# Patient Record
Sex: FEMALE | Race: WHITE | NOT HISPANIC OR LATINO | Employment: UNEMPLOYED | RURAL
[De-identification: names, ages, dates, MRNs, and addresses within clinical notes are randomized per-mention and may not be internally consistent; named-entity substitution may affect disease eponyms.]

---

## 2017-05-10 PROBLEM — Z00.00 WELLNESS EXAMINATION: Status: ACTIVE | Noted: 2017-05-10

## 2017-05-10 PROBLEM — J30.9 ALLERGIC RHINITIS: Chronic | Status: ACTIVE | Noted: 2017-05-10

## 2017-05-10 PROBLEM — E66.9 OBESITY: Chronic | Status: ACTIVE | Noted: 2017-05-10

## 2017-05-11 ENCOUNTER — OFFICE VISIT (OUTPATIENT)
Dept: FAMILY MEDICINE CLINIC | Facility: CLINIC | Age: 37
End: 2017-05-11

## 2017-05-11 VITALS
HEIGHT: 64 IN | DIASTOLIC BLOOD PRESSURE: 84 MMHG | BODY MASS INDEX: 33.63 KG/M2 | RESPIRATION RATE: 18 BRPM | HEART RATE: 116 BPM | TEMPERATURE: 98.6 F | OXYGEN SATURATION: 96 % | SYSTOLIC BLOOD PRESSURE: 126 MMHG | WEIGHT: 197 LBS

## 2017-05-11 DIAGNOSIS — M25.579 ANKLE PAIN, UNSPECIFIED CHRONICITY, UNSPECIFIED LATERALITY: Primary | ICD-10-CM

## 2017-05-11 DIAGNOSIS — F41.9 ANXIETY: ICD-10-CM

## 2017-05-11 PROCEDURE — 99213 OFFICE O/P EST LOW 20 MIN: CPT | Performed by: FAMILY MEDICINE

## 2017-05-11 RX ORDER — ACETAMINOPHEN 500 MG
500 TABLET ORAL EVERY 6 HOURS PRN
COMMUNITY

## 2017-05-11 RX ORDER — CALCIUM CARBONATE 200(500)MG
2 TABLET,CHEWABLE ORAL AS NEEDED
COMMUNITY

## 2017-05-11 RX ORDER — DIPHENHYDRAMINE HCL 25 MG
25 CAPSULE ORAL EVERY 6 HOURS PRN
COMMUNITY

## 2017-05-12 ENCOUNTER — HOSPITAL ENCOUNTER (OUTPATIENT)
Dept: HOSPITAL 58 - RAD | Age: 37
Discharge: HOME | End: 2017-05-12
Attending: FAMILY MEDICINE

## 2017-05-12 VITALS — BODY MASS INDEX: 29.2 KG/M2

## 2017-05-12 DIAGNOSIS — M25.572: Primary | ICD-10-CM

## 2017-05-12 DIAGNOSIS — F41.9 ANXIETY: ICD-10-CM

## 2017-05-12 DIAGNOSIS — E66.9 OBESITY, UNSPECIFIED OBESITY SEVERITY, UNSPECIFIED OBESITY TYPE: Primary | Chronic | ICD-10-CM

## 2017-05-12 DIAGNOSIS — Z00.00 WELLNESS EXAMINATION: ICD-10-CM

## 2017-05-12 NOTE — DI
EXAM:  LEFT ANKLE 3 VIEWS 

  

HISTORY:  Injury, pain 

  

  

FINDINGS:    Bone and joint structures appear normal.  There is no fracture, joint dislocation or mino
int effusion.  Bone density unremarkable. 

  

IMPRESSION:  Bone and joint structures are within normal limits.

## 2017-05-13 LAB
ALBUMIN SERPL-MCNC: 4.6 G/DL (ref 3.5–5.5)
ALBUMIN/GLOB SERPL: 1.6 {RATIO} (ref 1.2–2.2)
ALP SERPL-CCNC: 54 IU/L (ref 39–117)
ALT SERPL-CCNC: 9 IU/L (ref 0–32)
AST SERPL-CCNC: 12 IU/L (ref 0–40)
BASOPHILS # BLD AUTO: 0 X10E3/UL (ref 0–0.2)
BASOPHILS NFR BLD AUTO: 0 %
BILIRUB SERPL-MCNC: 0.5 MG/DL (ref 0–1.2)
BUN SERPL-MCNC: 13 MG/DL (ref 6–20)
BUN/CREAT SERPL: 18 (ref 9–23)
CALCIUM SERPL-MCNC: 9.5 MG/DL (ref 8.7–10.2)
CHLORIDE SERPL-SCNC: 100 MMOL/L (ref 96–106)
CHOLEST SERPL-MCNC: 198 MG/DL (ref 100–199)
CO2 SERPL-SCNC: 20 MMOL/L (ref 18–29)
CREAT SERPL-MCNC: 0.74 MG/DL (ref 0.57–1)
EOSINOPHIL # BLD AUTO: 0.5 X10E3/UL (ref 0–0.4)
EOSINOPHIL NFR BLD AUTO: 6 %
ERYTHROCYTE [DISTWIDTH] IN BLOOD BY AUTOMATED COUNT: 13.1 % (ref 12.3–15.4)
GLOBULIN SER CALC-MCNC: 2.8 G/DL (ref 1.5–4.5)
GLUCOSE SERPL-MCNC: 96 MG/DL (ref 65–99)
HBA1C MFR BLD: 5.4 % (ref 4.8–5.6)
HCT VFR BLD AUTO: 41.2 % (ref 34–46.6)
HDLC SERPL-MCNC: 69 MG/DL
HGB BLD-MCNC: 13.8 G/DL (ref 11.1–15.9)
IMM GRANULOCYTES # BLD: 0 X10E3/UL (ref 0–0.1)
IMM GRANULOCYTES NFR BLD: 0 %
LDLC SERPL CALC-MCNC: 115 MG/DL (ref 0–99)
LDLC/HDLC SERPL: 1.7 RATIO UNITS (ref 0–3.2)
LYMPHOCYTES # BLD AUTO: 2.2 X10E3/UL (ref 0.7–3.1)
LYMPHOCYTES NFR BLD AUTO: 27 %
MCH RBC QN AUTO: 28.3 PG (ref 26.6–33)
MCHC RBC AUTO-ENTMCNC: 33.5 G/DL (ref 31.5–35.7)
MCV RBC AUTO: 84 FL (ref 79–97)
MONOCYTES # BLD AUTO: 0.6 X10E3/UL (ref 0.1–0.9)
MONOCYTES NFR BLD AUTO: 7 %
NEUTROPHILS # BLD AUTO: 5 X10E3/UL (ref 1.4–7)
NEUTROPHILS NFR BLD AUTO: 60 %
PLATELET # BLD AUTO: 407 X10E3/UL (ref 150–379)
POTASSIUM SERPL-SCNC: 5 MMOL/L (ref 3.5–5.2)
PROT SERPL-MCNC: 7.4 G/DL (ref 6–8.5)
RBC # BLD AUTO: 4.88 X10E6/UL (ref 3.77–5.28)
SODIUM SERPL-SCNC: 140 MMOL/L (ref 134–144)
TRIGL SERPL-MCNC: 69 MG/DL (ref 0–149)
TSH SERPL DL<=0.005 MIU/L-ACNC: 1.69 UIU/ML (ref 0.45–4.5)
VLDLC SERPL CALC-MCNC: 14 MG/DL (ref 5–40)
WBC # BLD AUTO: 8.3 X10E3/UL (ref 3.4–10.8)

## 2017-05-16 DIAGNOSIS — D75.839 THROMBOCYTOSIS: Primary | ICD-10-CM

## 2017-05-18 ENCOUNTER — TELEPHONE (OUTPATIENT)
Dept: FAMILY MEDICINE CLINIC | Facility: CLINIC | Age: 37
End: 2017-05-18

## 2017-05-18 RX ORDER — AZITHROMYCIN 250 MG/1
TABLET, FILM COATED ORAL
Qty: 6 TABLET | Refills: 0 | Status: SHIPPED | OUTPATIENT
Start: 2017-05-18 | End: 2019-09-03 | Stop reason: SDUPTHER

## 2017-06-14 LAB
BASOPHILS # BLD AUTO: 0 X10E3/UL (ref 0–0.2)
BASOPHILS NFR BLD AUTO: 1 %
EOSINOPHIL # BLD AUTO: 0.8 X10E3/UL (ref 0–0.4)
EOSINOPHIL NFR BLD AUTO: 10 %
ERYTHROCYTE [DISTWIDTH] IN BLOOD BY AUTOMATED COUNT: 13.8 % (ref 12.3–15.4)
HCT VFR BLD AUTO: 40.9 % (ref 34–46.6)
HGB BLD-MCNC: 13.5 G/DL (ref 11.1–15.9)
IMM GRANULOCYTES # BLD: 0 X10E3/UL (ref 0–0.1)
IMM GRANULOCYTES NFR BLD: 0 %
LYMPHOCYTES # BLD AUTO: 2 X10E3/UL (ref 0.7–3.1)
LYMPHOCYTES NFR BLD AUTO: 27 %
MCH RBC QN AUTO: 28.1 PG (ref 26.6–33)
MCHC RBC AUTO-ENTMCNC: 33 G/DL (ref 31.5–35.7)
MCV RBC AUTO: 85 FL (ref 79–97)
MONOCYTES # BLD AUTO: 0.5 X10E3/UL (ref 0.1–0.9)
MONOCYTES NFR BLD AUTO: 7 %
NEUTROPHILS # BLD AUTO: 4.3 X10E3/UL (ref 1.4–7)
NEUTROPHILS NFR BLD AUTO: 55 %
PLATELET # BLD AUTO: 407 X10E3/UL (ref 150–379)
RBC # BLD AUTO: 4.8 X10E6/UL (ref 3.77–5.28)
WBC # BLD AUTO: 7.6 X10E3/UL (ref 3.4–10.8)

## 2017-06-16 ENCOUNTER — RESULTS ENCOUNTER (OUTPATIENT)
Dept: FAMILY MEDICINE CLINIC | Facility: CLINIC | Age: 37
End: 2017-06-16

## 2017-06-16 DIAGNOSIS — D75.839 THROMBOCYTOSIS: ICD-10-CM

## 2017-11-07 DIAGNOSIS — D75.839 THROMBOCYTOSIS: Primary | ICD-10-CM

## 2017-11-08 ENCOUNTER — RESULTS ENCOUNTER (OUTPATIENT)
Dept: FAMILY MEDICINE CLINIC | Facility: CLINIC | Age: 37
End: 2017-11-08

## 2017-11-08 DIAGNOSIS — D75.839 THROMBOCYTOSIS: ICD-10-CM

## 2017-11-08 PROBLEM — Z01.89 LABORATORY TEST: Status: ACTIVE | Noted: 2017-11-08

## 2017-11-08 LAB
ALBUMIN SERPL-MCNC: 4.7 G/DL (ref 3.5–5)
ALBUMIN/GLOB SERPL: 1.6 G/DL (ref 1.1–2.5)
ALP SERPL-CCNC: 64 U/L (ref 24–120)
ALT SERPL-CCNC: 25 U/L (ref 0–54)
AST SERPL-CCNC: 16 U/L (ref 7–45)
BASOPHILS # BLD AUTO: 0.04 10*3/MM3 (ref 0–0.2)
BASOPHILS NFR BLD AUTO: 0.4 % (ref 0–2)
BILIRUB SERPL-MCNC: 0.5 MG/DL (ref 0.1–1)
BUN SERPL-MCNC: 19 MG/DL (ref 5–21)
BUN/CREAT SERPL: 20 (ref 7–25)
CALCIUM SERPL-MCNC: 10.1 MG/DL (ref 8.4–10.4)
CHLORIDE SERPL-SCNC: 102 MMOL/L (ref 98–110)
CO2 SERPL-SCNC: 25 MMOL/L (ref 24–31)
CREAT SERPL-MCNC: 0.95 MG/DL (ref 0.5–1.4)
EOSINOPHIL # BLD AUTO: 0.51 10*3/MM3 (ref 0–0.7)
EOSINOPHIL NFR BLD AUTO: 5.5 % (ref 0–4)
ERYTHROCYTE [DISTWIDTH] IN BLOOD BY AUTOMATED COUNT: 12.9 % (ref 12–15)
GFR SERPLBLD CREATININE-BSD FMLA CKD-EPI: 67 ML/MIN/1.73
GFR SERPLBLD CREATININE-BSD FMLA CKD-EPI: 81 ML/MIN/1.73
GLOBULIN SER CALC-MCNC: 2.9 GM/DL
GLUCOSE SERPL-MCNC: 92 MG/DL (ref 70–100)
HCT VFR BLD AUTO: 41.7 % (ref 37–47)
HGB BLD-MCNC: 13.6 G/DL (ref 12–16)
IMM GRANULOCYTES # BLD: 0.03 10*3/MM3 (ref 0–0.03)
IMM GRANULOCYTES NFR BLD: 0.3 % (ref 0–5)
LYMPHOCYTES # BLD AUTO: 2.45 10*3/MM3 (ref 0.72–4.86)
LYMPHOCYTES NFR BLD AUTO: 26.6 % (ref 15–45)
MCH RBC QN AUTO: 28.7 PG (ref 28–32)
MCHC RBC AUTO-ENTMCNC: 32.6 G/DL (ref 33–36)
MCV RBC AUTO: 88 FL (ref 82–98)
MONOCYTES # BLD AUTO: 0.6 10*3/MM3 (ref 0.19–1.3)
MONOCYTES NFR BLD AUTO: 6.5 % (ref 4–12)
NEUTROPHILS # BLD AUTO: 5.57 10*3/MM3 (ref 1.87–8.4)
NEUTROPHILS NFR BLD AUTO: 60.7 % (ref 39–78)
PLATELET # BLD AUTO: 396 10*3/MM3 (ref 130–400)
POTASSIUM SERPL-SCNC: 5 MMOL/L (ref 3.5–5.3)
PROT SERPL-MCNC: 7.6 G/DL (ref 6.3–8.7)
RBC # BLD AUTO: 4.74 10*6/MM3 (ref 4.2–5.4)
SODIUM SERPL-SCNC: 143 MMOL/L (ref 135–145)
WBC # BLD AUTO: 9.2 10*3/MM3 (ref 4.8–10.8)

## 2019-08-30 ENCOUNTER — HOSPITAL ENCOUNTER (EMERGENCY)
Dept: HOSPITAL 58 - ED | Age: 39
Discharge: HOME | End: 2019-08-30

## 2019-08-30 VITALS — SYSTOLIC BLOOD PRESSURE: 157 MMHG | TEMPERATURE: 98.4 F | DIASTOLIC BLOOD PRESSURE: 83 MMHG

## 2019-08-30 VITALS — BODY MASS INDEX: 33.7 KG/M2

## 2019-08-30 DIAGNOSIS — J32.0: ICD-10-CM

## 2019-08-30 DIAGNOSIS — R11.2: Primary | ICD-10-CM

## 2019-08-30 PROCEDURE — 99282 EMERGENCY DEPT VISIT SF MDM: CPT

## 2019-08-30 NOTE — ED.PDOC
General


ED Provider: 


Dr. FAMILIA WATKINS





Chief Complaint: Nausea/Vomiting


Stated Complaint: sinus pressure frontal


Time Seen by Physician: 15:00


Mode of Arrival: Walk-In


Information Source: Patient


Exam Limitations: No limitations


Primary Care Provider: 


GORDON KING





Nursing and Triage Documentation Reviewed and Agree: Yes


Does patient meet sepsis criteria?: No


System Inflammatory Response Syndrome: Not Applicable


Sepsis Protocol: 


For patient's 13 years and over:





Temp is 96.8 and below  and greater


Pulse >90 BPM


Resp >20/minute


Acutely Altered Mental Status





Are patient's symptoms suggestive of a new infection, such as:


   -Pneumonia


   -Skin, Soft Tissue


   -Endocarditis


   -UTI


   -Bone, Joint Infection


   -Implantable Device


   -Acute Abdominal Infection


   -Wound Infection


   -Meningitis


   -Blood Stream Catheter Infection


   -Unknown








EENT Complaint Exam





- Throat Complaint/Exam


Onset/Duration: 3 days


Symptoms Are: Still present


Timimg: Intermittent


Initial Severity: Mild


Current Severity: Mild


Aggravating: Reports: None


Alleviating: Reports: None


Associated Signs and Symptoms: Denies: Fever, Dysphagia, Drooling, Foreign body 

sensation, Chills, Cough, Wheezing, Hoarseness, Sinus discomfort, Nasal 

congestion, Difficulty breathing, Lethargy, Irritability, Decreased activity, 

Vomiting, Diarrhea, Decreased hearing, Ear drainage


Uvula Midline: Yes


Melanie-tonsillar Fluctuence: No


Scarlatinaform Rash Present: No


Lesions: Absent: Lip, Gums, Tongue, Buccal Mucosa, Pharynx


Exanthem: Absent: Lip, Gums, Tongue, Buccal Mucosa, Pharynx


Vesicles: Absent: Lip, Gums, Tongue, Buccal Mucosa, Pharynx


Stridor Present: No


Sinus Tenderness Present: No


Tonsillar Hypertrophy Present: No


Tonsillar Exudate Present: No


Melanie-tonsillar Swelling Present: No


Adenopathy Present: No


Splenomegaly Present: No (frontal sinus pain and poor transilluminarion)





Review of Systems





- Review Of Systems


Constitutional: Reports: No symptoms


Eyes: Reports: No symptoms


Ears, Nose, Mouth, Throat: Denies: Ear pain, Ear discharge, Nose pain (frontal 

sinus tenderness)


Respiratory: Reports: No symptoms


Cardiac: Reports: No symptoms


GI: Reports: Nausea, Poor appetite


: Reports: No symptoms


Musculoskeletal: Reports: No symptoms


Skin: Reports: No symptoms


Neurological: Reports: No symptoms


Endocrine: Reports: No symptoms


Hematologic/Lymphatic: Reports: No symptoms


All Other Systems: Reviewed and Negative





Past Medical History





- Past Medical History


Previously Healthy: Yes


Endocrine: Reports: None


Cardiovascular: Reports: None


Respiratory: Reports: None


Hematological: Reports: None


Gastrointestinal: Reports: None


Genitourinary: Reports: None


Neuro/Psych: Reports: None


Musculoskeletal: Reports: None


Cancer: Reports: None


Last Menstrual Period: 4 days ago





- Surgical History


General Surgical History: Reports: Unknown





- Family History


Family History: Reports: Unknown





- Social History


Smoking Status: Never smoker


Hx Substance Use: No


Alcohol Screening: None





- Immunizations


Tetanus Shot up to Date: Yes





Physical Exam





- Physical Exam


Appearance: Well-appearing, No pain distress, Well-nourished


Eyes: VANESSA, EOMI, Conjunctiva clear


ENT: Ears normal, Nose normal, Oropharynx normal


Respiratory: Airway patent, Breath sounds clear, Breath sounds equal, 

Respirations nonlabored


Cardiovascular: RRR, Pulses normal, No rub, No murmur


GI/: Soft, Nontender, No masses, Bowel sounds normal, No Organomegaly


Musculoskeletal: Normal strength, ROM intact, No edema, No calf tenderness


Skin: Warm, Dry, Normal color


Neurological: Sensation intact, Motor intact, Reflexes intact, Cranial nerves 

intact, Alert, Oriented


Psychiatric: Affect appropriate, Mood appropriate





Critical Care Note





- Critical Care Note


Total Time (mins): 0





Course





- Course


Vital Signs: 





 











  Temp Pulse Resp BP Pulse Ox


 


 08/30/19 14:59  98.4 F  120 H  20  157/83 H  98














Departure





- Departure


Time of Disposition: 15:19


Disposition: HOME SELF-CARE


Discharge Problem: 


 Nausea, Vomiting





Sinusitis


Qualifiers:


 Sinusitis location: maxillary Chronicity: unspecified Qualified Code(s): J32.0 

- Chronic maxillary sinusitis





Instructions:  Sinusitis (ED)


Condition: Good


Pt referred to PMD for follow-up: Yes


IPMP verified?: No


Additional Instructions: 


Please call your Family Physician as soon as possible to schedule a follow-up 

appointment.


Allergies/Adverse Reactions: 


Allergies





Penicillins Adverse Reaction (Verified 11/01/15 21:39)


 








Home Medications: 


Ambulatory Orders





Acetaminophen [Tylenol] 325 mg PO Q4H PRN 11/01/15 


Diphenhydramine HCl [Benadryl] 25 mg PO Q6H PRN 11/01/15 


Hydrocortisone [Hydrocortisone 1% Cream] 1 applic TP BID PRN 11/01/15 


Cefdinir [Omnicef] 300 mg PO Q12HR #20 capsule 08/30/19 


Prednisone 20 mg PO DAILYWM #5 tablet 08/30/19 








Disposition Discussed With: Patient

## 2019-09-03 ENCOUNTER — TELEPHONE (OUTPATIENT)
Dept: FAMILY MEDICINE CLINIC | Facility: CLINIC | Age: 39
End: 2019-09-03

## 2019-09-03 DIAGNOSIS — J06.9 ACUTE URI: Primary | ICD-10-CM

## 2019-09-03 RX ORDER — AZITHROMYCIN 250 MG/1
TABLET, FILM COATED ORAL
Qty: 6 TABLET | Refills: 0 | Status: SHIPPED | OUTPATIENT
Start: 2019-09-03 | End: 2019-09-10 | Stop reason: SDUPTHER

## 2019-09-03 NOTE — TELEPHONE ENCOUNTER
Confirm no pregnancy  z pack      Current Outpatient Medications:   •  acetaminophen (TYLENOL) 500 MG tablet, Take 500 mg by mouth Every 6 (Six) Hours As Needed for Mild Pain (1-3)., Disp: , Rfl:   •  azithromycin (ZITHROMAX Z-STEPHANIE) 250 MG tablet, Take 2 tablets the first day, then 1 tablet daily for 4 days., Disp: 6 tablet, Rfl: 0  •  calcium carbonate (TUMS) 500 MG chewable tablet, Chew 2 tablets As Needed for Indigestion or Heartburn., Disp: , Rfl:   •  diphenhydrAMINE (BENADRYL) 25 mg capsule, Take 25 mg by mouth Every 6 (Six) Hours As Needed for Itching or Allergies., Disp: , Rfl:   •  Omeprazole Magnesium (PRILOSEC OTC PO), Take 1 capsule by mouth Daily As Needed (heartburn)., Disp: , Rfl:   •  sodium-potassium bicarbonate (CINTHIA-SELTZER GOLD) effervescent tablet, Take 2 tablets by mouth Every 4 (Four) Hours As Needed (cold and sinus)., Disp: , Rfl:

## 2019-09-03 NOTE — TELEPHONE ENCOUNTER
"Vm \"I went to [facility] for a sinus infection and they gave me antibiotic Omnicef and steroid, I had a reaction and cant take them. Can Dr Deng order me something that I have had before for a sinus infection? Im allergic to PCN and I guess omnicef now\"  "

## 2019-09-03 NOTE — TELEPHONE ENCOUNTER
"Notified pt and wants appt tomorrow and advised \"100% not pregnant\" wants zpack and sent, getting appt scheduled  "

## 2019-09-04 ENCOUNTER — OFFICE VISIT (OUTPATIENT)
Dept: FAMILY MEDICINE CLINIC | Facility: CLINIC | Age: 39
End: 2019-09-04

## 2019-09-04 VITALS
SYSTOLIC BLOOD PRESSURE: 132 MMHG | WEIGHT: 196 LBS | BODY MASS INDEX: 33.46 KG/M2 | TEMPERATURE: 97.8 F | DIASTOLIC BLOOD PRESSURE: 74 MMHG | HEART RATE: 80 BPM | OXYGEN SATURATION: 96 % | HEIGHT: 64 IN | RESPIRATION RATE: 16 BRPM

## 2019-09-04 DIAGNOSIS — Z79.01 ANTICOAGULATED: Primary | ICD-10-CM

## 2019-09-04 DIAGNOSIS — F41.9 ANXIETY: ICD-10-CM

## 2019-09-04 DIAGNOSIS — R11.0 NAUSEA: ICD-10-CM

## 2019-09-04 DIAGNOSIS — R10.11 RIGHT UPPER QUADRANT ABDOMINAL PAIN: ICD-10-CM

## 2019-09-04 DIAGNOSIS — D75.839 THROMBOCYTOSIS: ICD-10-CM

## 2019-09-04 PROCEDURE — 99213 OFFICE O/P EST LOW 20 MIN: CPT | Performed by: FAMILY MEDICINE

## 2019-09-04 RX ORDER — OMEPRAZOLE 20 MG/1
20 TABLET, DELAYED RELEASE ORAL 2 TIMES DAILY
Start: 2019-09-04

## 2019-09-04 RX ORDER — ONDANSETRON 4 MG/1
4 TABLET, FILM COATED ORAL EVERY 8 HOURS PRN
Qty: 8 TABLET | Refills: 0 | Status: SHIPPED | OUTPATIENT
Start: 2019-09-04

## 2019-09-04 NOTE — PROGRESS NOTES
"Subjective   Caro King is a 39 y.o. female presenting with chief complaint of:   Chief Complaint   Patient presents with   • Nausea   • Anxiety     heart palpations        History of Present Illness :  Alone.  Here for primarily an acute issue today; nausea.       Has few chronic problems to consider that might have a bearing on today's issues; not an interval appointment.       Chronic/acute problems reviewed today:   1. Nausea: acute on/off.  Worse with eating.  Some hearturn.  Some/mild JOCELIN discomfort.  No melena, hematochezia.  No recent xrays.    2. Anxiety: Chronic/variable:  On/off anxiety tolerated somewhat.  Not interested in Rx change. Stress ongoing tolerated. .      3. Thrombocytosis-ASA 81 advised      Has an/another acute issue today: none.    The following portions of the patient's history were reviewed and updated as appropriate: allergies, current medications, past family history, past medical history, past social history, past surgical history and problem list.      Current Outpatient Medications:   •  acetaminophen (TYLENOL) 500 MG tablet, Take 500 mg by mouth Every 6 (Six) Hours As Needed for Mild Pain (1-3)., Disp: , Rfl:   •  calcium carbonate (TUMS) 500 MG chewable tablet, Chew 2 tablets As Needed for Indigestion or Heartburn., Disp: , Rfl:   •  diphenhydrAMINE (BENADRYL) 25 mg capsule, Take 25 mg by mouth Every 6 (Six) Hours As Needed for Itching or Allergies., Disp: , Rfl:   •  Omeprazole Magnesium (PRILOSEC OTC PO), Take 1 capsule by mouth Daily As Needed (heartburn)., Disp: , Rfl:   •  sodium-potassium bicarbonate (CINTHIA-SELTZER GOLD) effervescent tablet, Take 2 tablets by mouth Every 4 (Four) Hours As Needed (cold and sinus)., Disp: , Rfl:     No problems with medications.  Refills if needed done    Allergies   Allergen Reactions   • Omnicef [Cefdinir] Itching and Cough   • Penicillins Other (See Comments)     \"as child, i haven't taken it since\"       Review of Systems  GENERAL:  " "Active/slower with limits, speed, samni for age.  Sleep is ok. No fever now  ENDO:  No syncope, near or diaphoretic sweaty spells.  HEENT: No head injury  headache.   No vision change.  No hearing loss.  Ears without pain/drainage.  No sore throat.  No nasal/sinus congestion/drainage. No epistaxis.  CHEST: No chest wall tenderness or mass. No cough,  without wheeze, SOB; no hemoptysis.  CV: No chest pain, palpatations, occ mild end of day ankle edema; at times \"weak\"; cannot tell if anxious  GI: No usual heartburn, dysphagia.  No usual abdominal pain, diarrhea, constipation, rectal bleeding, or melena.    :  Voids without dysuria, or incontience to completion.  ORTHO: No painful/swollen joints but various on /off sore.  No  sore neck or back.  No acute neck or back pain without recent injury.   NEURO: No dizziness, weakness of extremities.  No numbness/parethesias.   PSYCH: No memory loss.  Mood good; occ mild anxious, depressed but/and not suicidal.  Tolerated stress.   Screening:  Mammogram: NA  Bone density: NA  Low dose CT chest: Tobacco-smoker/none: NA  GI: NA  Prostate: NA  Usual lab order  NA    Lab Results:  Results for orders placed or performed in visit on 11/08/17   Comprehensive Metabolic Panel   Result Value Ref Range    Glucose 92 70 - 100 mg/dL    BUN 19 5 - 21 mg/dL    Creatinine 0.95 0.50 - 1.40 mg/dL    eGFR Non African Am 67 >60 mL/min/1.73    eGFR African Am 81 >60 mL/min/1.73    BUN/Creatinine Ratio 20.0 7.0 - 25.0    Sodium 143 135 - 145 mmol/L    Potassium 5.0 3.5 - 5.3 mmol/L    Chloride 102 98 - 110 mmol/L    Total CO2 25.0 24.0 - 31.0 mmol/L    Calcium 10.1 8.4 - 10.4 mg/dL    Total Protein 7.6 6.3 - 8.7 g/dL    Albumin 4.70 3.50 - 5.00 g/dL    Globulin 2.9 gm/dL    A/G Ratio 1.6 1.1 - 2.5 g/dL    Total Bilirubin 0.5 0.1 - 1.0 mg/dL    Alkaline Phosphatase 64 24 - 120 U/L    AST (SGOT) 16 7 - 45 U/L    ALT (SGPT) 25 0 - 54 U/L   CBC & Differential   Result Value Ref Range    WBC 9.20 4.80 " "- 10.80 10*3/mm3    RBC 4.74 4.20 - 5.40 10*6/mm3    Hemoglobin 13.6 12.0 - 16.0 g/dL    Hematocrit 41.7 37.0 - 47.0 %    MCV 88.0 82.0 - 98.0 fL    MCH 28.7 28.0 - 32.0 pg    MCHC 32.6 (L) 33.0 - 36.0 g/dL    RDW 12.9 12.0 - 15.0 %    Platelets 396 130 - 400 10*3/mm3    Neutrophil Rel % 60.7 39.0 - 78.0 %    Lymphocyte Rel % 26.6 15.0 - 45.0 %    Monocyte Rel % 6.5 4.0 - 12.0 %    Eosinophil Rel % 5.5 (H) 0.0 - 4.0 %    Basophil Rel % 0.4 0.0 - 2.0 %    Neutrophils Absolute 5.57 1.87 - 8.40 10*3/mm3    Lymphocytes Absolute 2.45 0.72 - 4.86 10*3/mm3    Monocytes Absolute 0.60 0.19 - 1.30 10*3/mm3    Eosinophils Absolute 0.51 0.00 - 0.70 10*3/mm3    Basophils Absolute 0.04 0.00 - 0.20 10*3/mm3    Immature Granulocyte Rel % 0.3 0.0 - 5.0 %    Immature Grans Absolute 0.03 0.00 - 0.03 10*3/mm3       Objective   /74   Pulse 80   Temp 97.8 °F (36.6 °C)   Resp 16   Ht 162.6 cm (64\")   Wt 88.9 kg (196 lb)   SpO2 96%   Breastfeeding? No   BMI 33.64 kg/m²   Body mass index is 33.64 kg/m².    Physical Exam  GENERAL:  Well nourished/developed in no acute distress. Obese   SKIN: Turgor excellent, without wound, rash, lesion  HEENT: Normal cephalic without trauma.  Pupils equal round reactive to light. Extraocular motions full without nystagmus.  No thyroidmegaly, mass, tenderness, lymphadenopathy and supple.  CV: Regular rhythm.  No murmur, gallop,  edema. Posterior pulse L ok.  CHEST: No chest wall tenderness or mass.   LUNGS: Symmetric motion with clear to auscultation.   ABD: Soft, nontender without mass.   ORTHO: Symmetric extremities without swelling/point tenderness.  Full gross range of motion. L ankle symmetric to R.  FROM; stiff/sore extremes.  No instability.  Achilles intact. Sore over lateral/lateral-anterior mallelous..  Stretched ace wrap on.   NEURO: CN 2-12 grossly intact.  Symmetric facies. 1/4 x bicep knee equal reflexes.  UE/LE   3/5 strength throughout.  Nonfocal use extremities. Speech clear.  "   PSYCH: Oriented x 3.  Pleasant calm, well kept.  Purposeful/directed conservation with intact short/long gross memory.     Assessment/Plan     1. Anticoagulated: thrombocytosis/ASA 81    2. Nausea    3. Anxiety    4. Thrombocytosis-ASA 81 advised    5. Right upper quadrant abdominal pain      ? Unifying dx  ? peptic    Rx: reviewed/changes:  New Medications Ordered This Visit   Medications   • omeprazole OTC (PRILOSEC OTC) 20 MG EC tablet     Sig: Take 1 tablet by mouth 2 (Two) Times a Day.   • ondansetron (ZOFRAN) 4 MG tablet     Sig: Take 1 tablet by mouth Every 8 (Eight) Hours As Needed for Nausea or Vomiting.     Dispense:  8 tablet     Refill:  0     LAB/Testing/Referrals: reviewed/orders:   Today:   Orders Placed This Encounter   Procedures   • US Gallbladder     Chronic/recurrent labs above or change to:   none    Discussions:   GI eval  Body mass index is 33.64 kg/m².  Patient's Body mass index is 33.64 kg/m². BMI is above normal parameters. Recommendations include: exercise counseling, nutrition counseling and when able.    Tobacco use reviewed:    Non-smoker    There are no Patient Instructions on file for this visit.    Follow up: Return for xray this week.  No future appointments.

## 2019-09-05 ENCOUNTER — HOSPITAL ENCOUNTER (OUTPATIENT)
Dept: HOSPITAL 58 - RAD | Age: 39
Discharge: HOME | End: 2019-09-05
Attending: FAMILY MEDICINE

## 2019-09-05 VITALS — BODY MASS INDEX: 33.7 KG/M2

## 2019-09-05 DIAGNOSIS — R11.0: Primary | ICD-10-CM

## 2019-09-05 DIAGNOSIS — R10.11 RIGHT UPPER QUADRANT ABDOMINAL PAIN: ICD-10-CM

## 2019-09-05 DIAGNOSIS — R11.0 NAUSEA: ICD-10-CM

## 2019-09-05 DIAGNOSIS — F41.9 ANXIETY: Primary | ICD-10-CM

## 2019-09-05 DIAGNOSIS — R10.11: ICD-10-CM

## 2019-09-05 RX ORDER — ALPRAZOLAM 0.5 MG/1
TABLET ORAL
Qty: 3 TABLET | Refills: 0 | Status: SHIPPED | OUTPATIENT
Start: 2019-09-05

## 2019-09-05 NOTE — US
EXAM:  Right upper quadrant abdominal ultrasound. 

  

History:  Right upper quadrant abdominal pain and nausea. 

  

Technique:  Multiple sonographic images through the abdomen were obtained.  Color duplex Doppler was 
used to interrogate vascular flow. 

  

Findings: 

  

The liver is not enlarged.  No focal liver lesions.  There is antegrade flow within the main portal v
ein.  Pancreas is unremarkable.  No abdominal ascites.  No shadowing gallstones.  Gallbladder wall is
 not thickened.  Common bile duct measures 0.3 cm in caliber.  Limited visualization of the right kid
dougie demonstrates no abnormality. 

  

Impression:  Unremarkable exam

## 2019-09-06 ENCOUNTER — HOSPITAL ENCOUNTER (OUTPATIENT)
Dept: HOSPITAL 58 - RAD | Age: 39
Discharge: HOME | End: 2019-09-06
Attending: FAMILY MEDICINE

## 2019-09-06 VITALS — BODY MASS INDEX: 33.7 KG/M2

## 2019-09-06 DIAGNOSIS — K21.9 GASTROESOPHAGEAL REFLUX DISEASE, ESOPHAGITIS PRESENCE NOT SPECIFIED: ICD-10-CM

## 2019-09-06 DIAGNOSIS — R11.0 NAUSEA: ICD-10-CM

## 2019-09-06 DIAGNOSIS — R10.11 RIGHT UPPER QUADRANT ABDOMINAL PAIN: Primary | ICD-10-CM

## 2019-09-06 DIAGNOSIS — R11.0: ICD-10-CM

## 2019-09-06 DIAGNOSIS — R10.11 RIGHT UPPER QUADRANT ABDOMINAL PAIN: ICD-10-CM

## 2019-09-06 DIAGNOSIS — R10.11: Primary | ICD-10-CM

## 2019-09-06 RX ORDER — SUCRALFATE 1 G/1
1 TABLET ORAL 4 TIMES DAILY
Qty: 120 TABLET | Refills: 0 | Status: SHIPPED | OUTPATIENT
Start: 2019-09-06

## 2019-09-06 NOTE — NM
EXAM:  Hepatobiliary scan 

  

HISTORY: Right upper quadrant pain.. 

  

COMPARISON: 11/06/2015.  Ultrasound 09/05/2019 

  

PROCEDURE: The patient was injected with 5 mCi of 99mTc mebrofenin intravenously.  Images of the abdo
men were obtained  at 5 min intervals for 30 minutes.  Additional images were obtained at 45 minutes 
and 1 hour. The patient was then injected with 2 mcg of CCK by slow infusion while images of the gall
bladder were obtained to assess gallbladder contraction.  The patient described nausea associated wit
h the injection of CCK. 

  

FINDINGS: Sequential images demonstrate normal uptake of tracer into the liver.  Activity is seen in 
the intrahepatic biliary ducts at about 10 minutes.  The activity appears in the gallbladder at about
 10 minutes.  Subsequent images demonstrate increasing activity in the gallbladder.  Activity first a
ppears in the small bowel at 45 minutes. 

  

The gallbladder ejection fraction is 89%.  The previous ejection fraction was 89.1% 

  

IMPRESSION: 

  

1.Normal hepatobiliary scan. 

2.The gallbladder ejection fraction is 89% (normal). 

3.  The previous ejection fraction was 89.1%.

## 2019-09-10 ENCOUNTER — TELEPHONE (OUTPATIENT)
Dept: FAMILY MEDICINE CLINIC | Facility: CLINIC | Age: 39
End: 2019-09-10

## 2019-09-10 DIAGNOSIS — J06.9 ACUTE URI: ICD-10-CM

## 2019-09-10 RX ORDER — FLUTICASONE PROPIONATE 50 MCG
2 SPRAY, SUSPENSION (ML) NASAL DAILY
Qty: 16 G | Refills: 2 | Status: SHIPPED | OUTPATIENT
Start: 2019-09-10

## 2019-09-10 RX ORDER — AZITHROMYCIN 250 MG/1
TABLET, FILM COATED ORAL
Qty: 6 TABLET | Refills: 0 | Status: SHIPPED | OUTPATIENT
Start: 2019-09-10

## 2019-09-10 NOTE — TELEPHONE ENCOUNTER
Pt called and asked for another Z-Pack or a different one, says she still has the sinus infection or something and just can't get rid of it.

## 2019-09-10 NOTE — TELEPHONE ENCOUNTER
Works differently; 5 days of Rx and still in system 5 more days  Add flonase      Current Outpatient Medications:   •  acetaminophen (TYLENOL) 500 MG tablet, Take 500 mg by mouth Every 6 (Six) Hours As Needed for Mild Pain (1-3)., Disp: , Rfl:   •  ALPRAZolam (XANAX) 0.5 MG tablet, Pre-Medicate one by mouth two hours before procedure, if necessary may repeat at time of procedure, Disp: 3 tablet, Rfl: 0  •  azithromycin (ZITHROMAX Z-STEPHANIE) 250 MG tablet, Take 2 tablets the first day, then 1 tablet daily for 4 days., Disp: 6 tablet, Rfl: 0  •  calcium carbonate (TUMS) 500 MG chewable tablet, Chew 2 tablets As Needed for Indigestion or Heartburn., Disp: , Rfl:   •  diphenhydrAMINE (BENADRYL) 25 mg capsule, Take 25 mg by mouth Every 6 (Six) Hours As Needed for Itching or Allergies., Disp: , Rfl:   •  omeprazole OTC (PRILOSEC OTC) 20 MG EC tablet, Take 1 tablet by mouth 2 (Two) Times a Day., Disp: , Rfl:   •  ondansetron (ZOFRAN) 4 MG tablet, Take 1 tablet by mouth Every 8 (Eight) Hours As Needed for Nausea or Vomiting., Disp: 8 tablet, Rfl: 0  •  sodium-potassium bicarbonate (CINTHIA-SELTZER GOLD) effervescent tablet, Take 2 tablets by mouth Every 4 (Four) Hours As Needed (cold and sinus)., Disp: , Rfl:   •  sucralfate (CARAFATE) 1 g tablet, Take 1 tablet by mouth 4 (Four) Times a Day., Disp: 120 tablet, Rfl: 0